# Patient Record
Sex: MALE | Race: OTHER | Employment: PART TIME | URBAN - METROPOLITAN AREA
[De-identification: names, ages, dates, MRNs, and addresses within clinical notes are randomized per-mention and may not be internally consistent; named-entity substitution may affect disease eponyms.]

---

## 2021-10-01 ENCOUNTER — APPOINTMENT (OUTPATIENT)
Dept: GENERAL RADIOLOGY | Age: 21
End: 2021-10-01

## 2021-10-01 ENCOUNTER — HOSPITAL ENCOUNTER (EMERGENCY)
Age: 21
Discharge: HOME | End: 2021-10-01
Attending: STUDENT IN AN ORGANIZED HEALTH CARE EDUCATION/TRAINING PROGRAM

## 2021-10-01 VITALS
OXYGEN SATURATION: 100 % | DIASTOLIC BLOOD PRESSURE: 68 MMHG | SYSTOLIC BLOOD PRESSURE: 124 MMHG | RESPIRATION RATE: 16 BRPM | HEART RATE: 73 BPM

## 2021-10-01 DIAGNOSIS — S61.212A LACERATION OF RIGHT MIDDLE FINGER, FOREIGN BODY PRESENCE UNSPECIFIED, NAIL DAMAGE STATUS UNSPECIFIED, INITIAL ENCOUNTER: Primary | ICD-10-CM

## 2021-10-01 DIAGNOSIS — S68.619A: ICD-10-CM

## 2021-10-01 DIAGNOSIS — S62.660A CLOSED NONDISPLACED FRACTURE OF DISTAL PHALANX OF RIGHT INDEX FINGER, INITIAL ENCOUNTER: ICD-10-CM

## 2021-10-01 PROCEDURE — 6360000002 HC RX W HCPCS: Performed by: NURSE PRACTITIONER

## 2021-10-01 PROCEDURE — 90471 IMMUNIZATION ADMIN: CPT | Performed by: NURSE PRACTITIONER

## 2021-10-01 PROCEDURE — 6370000000 HC RX 637 (ALT 250 FOR IP): Performed by: NURSE PRACTITIONER

## 2021-10-01 PROCEDURE — 96375 TX/PRO/DX INJ NEW DRUG ADDON: CPT

## 2021-10-01 PROCEDURE — 99283 EMERGENCY DEPT VISIT LOW MDM: CPT

## 2021-10-01 PROCEDURE — 73130 X-RAY EXAM OF HAND: CPT

## 2021-10-01 PROCEDURE — 96374 THER/PROPH/DIAG INJ IV PUSH: CPT

## 2021-10-01 PROCEDURE — 90715 TDAP VACCINE 7 YRS/> IM: CPT | Performed by: NURSE PRACTITIONER

## 2021-10-01 PROCEDURE — 12001 RPR S/N/AX/GEN/TRNK 2.5CM/<: CPT

## 2021-10-01 RX ORDER — OXYCODONE HYDROCHLORIDE AND ACETAMINOPHEN 5; 325 MG/1; MG/1
1 TABLET ORAL EVERY 6 HOURS PRN
Qty: 10 TABLET | Refills: 0 | Status: SHIPPED | OUTPATIENT
Start: 2021-10-01 | End: 2021-10-04

## 2021-10-01 RX ORDER — CEPHALEXIN 500 MG/1
500 CAPSULE ORAL 4 TIMES DAILY
Qty: 40 CAPSULE | Refills: 0 | Status: SHIPPED | OUTPATIENT
Start: 2021-10-01 | End: 2021-10-11

## 2021-10-01 RX ORDER — SULFAMETHOXAZOLE AND TRIMETHOPRIM 800; 160 MG/1; MG/1
1 TABLET ORAL 2 TIMES DAILY
Qty: 20 TABLET | Refills: 0 | Status: SHIPPED | OUTPATIENT
Start: 2021-10-01 | End: 2021-10-11

## 2021-10-01 RX ORDER — SULFAMETHOXAZOLE AND TRIMETHOPRIM 800; 160 MG/1; MG/1
1 TABLET ORAL ONCE
Status: COMPLETED | OUTPATIENT
Start: 2021-10-01 | End: 2021-10-01

## 2021-10-01 RX ADMIN — Medication 2000 MG: at 14:50

## 2021-10-01 RX ADMIN — SULFAMETHOXAZOLE AND TRIMETHOPRIM 1 TABLET: 800; 160 TABLET ORAL at 16:17

## 2021-10-01 RX ADMIN — HYDROMORPHONE HYDROCHLORIDE 0.5 MG: 1 INJECTION, SOLUTION INTRAMUSCULAR; INTRAVENOUS; SUBCUTANEOUS at 14:51

## 2021-10-01 RX ADMIN — TETANUS TOXOID, REDUCED DIPHTHERIA TOXOID AND ACELLULAR PERTUSSIS VACCINE, ADSORBED 0.5 ML: 5; 2.5; 8; 8; 2.5 SUSPENSION INTRAMUSCULAR at 14:51

## 2021-10-01 ASSESSMENT — ENCOUNTER SYMPTOMS
SORE THROAT: 0
RHINORRHEA: 0
ABDOMINAL PAIN: 0
SHORTNESS OF BREATH: 0
COLOR CHANGE: 0

## 2021-10-01 ASSESSMENT — PAIN SCALES - GENERAL: PAINLEVEL_OUTOF10: 3

## 2021-10-01 NOTE — ED PROVIDER NOTES
I independently examined and evaluated Ronald Rosa. In brief, patient is a 41-year-old male, presenting with concerns for right second and third finger injuries after getting it caught on machinery. Had subsequent amputation of the distal portion of his right index finger, also with laceration on the right middle finger. He is right-hand dominant. Denies any other injuries. Tetanus is updated here in the department. Focused exam revealed amputation of the distal portion of the right index finger, also with laceration to the dorsal aspect of the right middle finger, holding the finger in flexion unable to fully extend. Capillary refill intervening digits less than 2 seconds. XR HAND RIGHT (MIN 3 VIEWS)   Final Result   Soft tissue and bony amputation of the 2nd finger tip      Remote ununited fracture of the navicular bone           ED course: Patient is a 41-year-old male, right-hand-dominant, presenting with concerns for injury to the right hand and finger amputation. Patient was seen in conjunction with NP Beatris Cabot, please refer to her note for further details of the patient's work-up and treatment. Due to the amputation, as well as possible extensor tendon injury in the adjacent digit, orthopedics was consulted. They requested that we start the patient on antibiotics, place a nonadherent bulky dressing on the amputation and have the patient follow-up in the office early next week. Findings were discussed with the patient. He is comfortable in agreement with plan of care. Given strict return precautions. All diagnostic, treatment, and disposition decisions were made by myself in conjunction with the advanced practice provider. For all further details of the patient's emergency department visit, please see the advanced practice provider's documentation. 1. Laceration of right middle finger, foreign body presence unspecified, nail damage status unspecified, initial encounter    2. Closed nondisplaced fracture of distal phalanx of right index finger, initial encounter    3. Complete traumatic amputation of finger through phalanx, initial encounter      Comment: Please note this report has been produced using speech recognition software and may contain errors related to that system including errors in grammar, punctuation, and spelling, as well as words and phrases that may be inappropriate. If there are any questions or concerns please feel free to contact the dictating provider for clarification.        Candace Anderson MD  10/01/21 9637

## 2021-10-01 NOTE — ED NOTES
Discharge instructions reviewed with patient and family member. Patient and family verbalized understanding. All home medications have been reviewed, questions answered and patient voiced understanding. Given prescriptions, discharge instructions, and appointment times. Instructions reviewed using .       Cathie Miner RN  10/01/21 6746

## 2021-10-01 NOTE — ED PROVIDER NOTES
[address] ED  EMERGENCY DEPARTMENT ENCOUNTER        Pt Name: Christian Emerson  MRN: [id number]  [place] 2000  Dateof evaluation: 10/1/2021  Provider: DENIZ Lee CNP  PCP: No primary care provider on file. ED Attending: No att. providers found    [address]       Chief Complaint   Patient presents with    Finger Injury     patient was working on an elevator and his finger got caught and  cut the tip of his pointer finger off. Rekhanet also has laceration to the middle finger. HISTORY OF PRESENTILLNESS   (Location/Symptom, Timing/Onset, Context/Setting, Quality, Duration, Modifying Factors, Severity)  Note limiting factors. Christian Emerson is a 24 y.o. male for finger injury. Onset was today. Context includes patient reports he was working on elevator when his right index finger became caught. Patient has amputated the distal portion of his right index finger. Patient is right-hand dominant. He is not up-to-date on his tetanus. Alleviating factors include nothing. Aggravating factors include nothing. Pain is 8/10. Nothing has been used for pain today. Nursing Notes were all reviewed and agreed with or any disagreements were addressed  in the HPI. REVIEW OF SYSTEMS    (2-9 systems for level 4, 10 or more for level 5)     Review of Systems   Constitutional: Negative for fever. HENT: Negative for congestion, rhinorrhea and sore throat. Respiratory: Negative for shortness of breath. Cardiovascular: Negative for chest pain. Gastrointestinal: Negative for abdominal pain. Genitourinary: Negative for decreased urine volume and difficulty urinating. Musculoskeletal: Negative for arthralgias and myalgias. Finger injury   Skin: Negative for color change and rash. Neurological: Negative for dizziness and light-headedness. Psychiatric/Behavioral: Negative for agitation. All other systems reviewed and are negative.       Positives and Pertinent negatives as per HPI. Except as noted above in the ROS, all other systems were reviewed and negative. PAST MEDICAL HISTORY   History reviewed. No pertinent past medical history. SURGICAL HISTORY     History reviewed. No pertinent surgical history. CURRENT MEDICATIONS       Previous Medications    No medications on file         ALLERGIES     Patient has no known allergies. FAMILY HISTORY     History reviewed. No pertinent family history. SOCIAL HISTORY       Social History     Socioeconomic History    Marital status: Single     Spouse name: None    Number of children: None    Years of education: None    Highest education level: None   Occupational History    None   Tobacco Use    Smoking status: Former Smoker   Substance and Sexual Activity    Alcohol use: Yes     Alcohol/week: 12.0 standard drinks     Types: 12 Cans of beer per week     Comment: daily     Drug use: Never    Sexual activity: None   Other Topics Concern    None   Social History Narrative    None     Social Determinants of Health     Financial Resource Strain:     Difficulty of Paying Living Expenses:    Food Insecurity:     Worried About Running Out of Food in the Last Year:     Ran Out of Food in the Last Year:    Transportation Needs:     Lack of Transportation (Medical):      Lack of Transportation (Non-Medical):    Physical Activity:     Days of Exercise per Week:     Minutes of Exercise per Session:    Stress:     Feeling of Stress :    Social Connections:     Frequency of Communication with Friends and Family:     Frequency of Social Gatherings with Friends and Family:     Attends [employer] Services:     Active Member of Clubs or Organizations:     Attends Club or Organization Meetings:     Marital Status:    Intimate Partner Violence:     Fear of Current or Ex-Partner:     Emotionally Abused:     Physically Abused:     Sexually Abused:        [address]el[address] Opening: Spontaneous  Best Verbal Response: Oriented  Best Motor Response: Obeys commands  [place] Coma Scale Score: 15        PHYSICAL EXAM  (up to 7 for level 4, 8 or more for level 5)     ED Triage Vitals   BP Temp Temp src Pulse Resp SpO2 Height Weight   -- -- -- -- -- -- -- --       Physical Exam  Constitutional:       Appearance: He is well-developed. HENT:      Head: Normocephalic and atraumatic. Cardiovascular:      Rate and Rhythm: Normal rate. Pulmonary:      Effort: Pulmonary effort is normal. No respiratory distress. Abdominal:      General: There is no distension. Palpations: Abdomen is soft. Musculoskeletal:         General: Tenderness and signs of injury present. No swelling or deformity. Normal range of motion. Cervical back: Normal range of motion. Comments: Patient is able to flex and extend at the right DIP joint however distal to the DIP joint is a complete amputation. Bleeding is controlled. 2cm laceration to the dorsal aspect of the right middle finger. Distal phalanx is slightly flexed and the patient is unable to extend it straight. Skin:     General: Skin is warm and dry. Neurological:      Mental Status: He is alert and oriented to person, place, and time. DIAGNOSTIC RESULTS   LABS:    Labs Reviewed - No data to display    All other labs werewithin normal range or not returned as of this dictation. EKG: All EKG's are interpreted by the Emergency Department Physician who either signs or Co-signs this chart in the absence of a cardiologist.  Please see their note for interpretation of EKG.       RADIOLOGY:   Right hand x-ray interpreted by radiologist for  FINDINGS:   There is amputation of the tip of the 2nd finger.  There is soft tissue loss   with fracture of the distal phalanx.  There is a bony fragment displaced   volarly.  Incidental note is made of a smooth irregular lucency extending   through the scaphoid bone most consistent with a un united relatively   nondisplaced fracture.  The remainder of the osseous structures are intact. Interpretation per the Radiologist below, if available at the time of this note:    XR HAND RIGHT (MIN 3 VIEWS)   Final Result   Soft tissue and bony amputation of the 2nd finger tip      Remote ununited fracture of the navicular bone           No results found. PROCEDURES   Unless otherwise noted below, none     Lac Repair    Date/Time: 10/1/2021 3:40 PM  Performed by: DENIZ Tovar - CNP  Authorized by: Missy Ireland MD     Consent:     Consent obtained:  Verbal    Consent given by:  Patient    Risks discussed:  Infection, pain, poor cosmetic result and poor wound healing    Alternatives discussed:  No treatment  Anesthesia (see MAR for exact dosages):      Anesthesia method:  Local infiltration    Local anesthetic:  Lidocaine 2% w/o epi  Laceration details:     Location:  Finger    Finger location:  R long finger    Length (cm):  2  Repair type:     Repair type:  Simple  Pre-procedure details:     Preparation:  Patient was prepped and draped in usual sterile fashion and imaging obtained to evaluate for foreign bodies  Exploration:     Hemostasis achieved with:  Direct pressure    Wound exploration: wound explored through full range of motion      Wound extent: areolar tissue violated and fascia violated      Wound extent: no foreign bodies/material noted, no muscle damage noted, no nerve damage noted, no tendon damage noted, no underlying fracture noted and no vascular damage noted    Treatment:     Area cleansed with:  Hibiclens    Amount of cleaning:  Standard    Irrigation solution:  Sterile water    Irrigation method:  Syringe  Skin repair:     Repair method:  Sutures    Suture size:  6-0    Suture material:  Prolene    Suture technique:  Simple interrupted    Number of sutures:  3  Approximation:     Approximation:  Loose  Post-procedure details:     Dressing:  Non-adherent dressing and splint for protection    Patient tolerance of procedure: Tolerated well, no immediate complications  Comments:      Right index finger was heavily padded and splinted in addition to the right middle finger was splinted. CRITICAL CARE TIME   N/A    CONSULTS:  IP CONSULT TO ORTHOPEDIC SURGERY      EMERGENCYDEPARTMENT COURSE and DIFFERENTIAL DIAGNOSIS/MDM:   Vitals:    Vitals:    10/01/21 1500   BP: (!) 107/49   Pulse: 69   Resp: 14   SpO2: 100%       Patient was given the following medications:  Medications   sulfamethoxazole-trimethoprim (BACTRIM DS;SEPTRA DS) 800-160 MG per tablet 1 tablet (has no administration in time range)   Tetanus-Diphth-Acell Pertussis (BOOSTRIX) injection 0.5 mL (0.5 mLs IntraMUSCular Given 10/1/21 1451)   ceFAZolin (ANCEF) 2000 mg in sterile water 20 mL IV syringe (2,000 mg IntraVENous Given 10/1/21 1450)   HYDROmorphone (DILAUDID) injection 0.5 mg (0.5 mg IntraVENous Given 10/1/21 1451)                       Patient was seen and evaluated by myself and Ira Dupont. Patient here after he was involved in accident while working on elevator. Patient states that he had his fingers injured in a belt. Patient is unsure of his tetanus status. Patient is right-hand dominant. On exam he is awake and alert hemodynamically stable nontoxic in appearance. Patient does have a complete amputation to the distal portion of his right index finger. He also has a laceration to the dorsal aspect of the distal portion of the right middle finger. He is unable to extend that finger and the tip is slightly flexed. Patient did have a laceration repair to the middle finger. I did talk with Dr. Stephanie Banuelos the PA from orthopedics who recommended nonstick heavy padded dressing for the right index finger and to follow-up with Ortho in a week. Patient was given Ancef in the ED was also given pain medications and up-to-date on his tetanus. Patient will be discharged with pain meds and antibiotics.  He was given strict instructions to follow-up with orthopedics for next week. He was encouraged to return to the ED sooner. Patient was provided with the PCP referral as well and encouraged to follow-up and establish care. Patient verbalized understanding. Patient was ultimately discharged home with all questions answered. The patient tolerated their visit well. I have evaluated this patient. My supervising physician was available for consultation. The patient and / or the family were informed of the results of any tests, a time was given to answer questions, a plan was proposed and they agreed with plan. FINAL IMPRESSION      1. Laceration of right middle finger, foreign body presence unspecified, nail damage status unspecified, initial encounter    2. Closed nondisplaced fracture of distal phalanx of right index finger, initial encounter    3. Complete traumatic amputation of finger through phalanx, initial encounter          DISPOSITION/PLAN   DISPOSITION        PATIENT REFERRED TO:  [facility]) Pre-Services  [phone]  Schedule an appointment as soon as possible for a visit in 1 week  to establish primary care    [facility] ED  [address]  [phone]    If symptoms worsen    [address] [facility], [address] [place] [address]    [phone]  Call today  to be re evaluated. , for re-evaluation      DISCHARGE MEDICATIONS:  New Prescriptions    CEPHALEXIN (KEFLEX) 500 MG CAPSULE    Take 1 capsule by mouth 4 times daily for 10 days    OXYCODONE-ACETAMINOPHEN (PERCOCET) 5-325 MG PER TABLET    Take 1 tablet by mouth every 6 hours as needed for Pain for up to 3 days. WARNING:  May cause drowsiness. May impair ability to operate vehicles or machinery. Do not use in combination with alcohol.     SULFAMETHOXAZOLE-TRIMETHOPRIM (BACTRIM DS) 800-160 MG PER TABLET    Take 1 tablet by mouth 2 times daily for 10 days       DISCONTINUED MEDICATIONS:  Discontinued Medications    No medications on file              (Please note that portions of this note were completed with a voice recognition program.  Efforts were made to edit the dictations but occasionally words are mis-transcribed.)    DENIZ Corea CNP (electronically signed)         DENIZ Corea CNP  10/01/21 1540

## 2021-10-01 NOTE — ED NOTES
[facility] [facility] Call served Ortho Annita Honeycutt  10/01/21 1418    Yadira Aragon NP talked to Annita CARTER w/Radha Patricio  10/01/21 7026

## 2021-10-06 ENCOUNTER — OFFICE VISIT (OUTPATIENT)
Dept: FAMILY MEDICINE CLINIC | Age: 21
End: 2021-10-06

## 2021-10-06 VITALS
HEART RATE: 75 BPM | RESPIRATION RATE: 16 BRPM | DIASTOLIC BLOOD PRESSURE: 68 MMHG | WEIGHT: 155.8 LBS | SYSTOLIC BLOOD PRESSURE: 118 MMHG | OXYGEN SATURATION: 99 % | TEMPERATURE: 97.8 F | HEIGHT: 68 IN | BODY MASS INDEX: 23.61 KG/M2

## 2021-10-06 DIAGNOSIS — S62.630B DISPLACED FRACTURE OF DISTAL PHALANX OF RIGHT INDEX FINGER, INITIAL ENCOUNTER FOR OPEN FRACTURE: ICD-10-CM

## 2021-10-06 DIAGNOSIS — S68.119A TRAUMATIC AMPUTATION OF TIP OF FINGER, INITIAL ENCOUNTER: Primary | ICD-10-CM

## 2021-10-06 DIAGNOSIS — S61.212A LACERATION OF RIGHT MIDDLE FINGER WITHOUT DAMAGE TO NAIL, FOREIGN BODY PRESENCE UNSPECIFIED, INITIAL ENCOUNTER: ICD-10-CM

## 2021-10-06 PROCEDURE — 99205 OFFICE O/P NEW HI 60 MIN: CPT | Performed by: PHYSICIAN ASSISTANT

## 2021-10-06 ASSESSMENT — PATIENT HEALTH QUESTIONNAIRE - PHQ9
2. FEELING DOWN, DEPRESSED OR HOPELESS: 0
SUM OF ALL RESPONSES TO PHQ QUESTIONS 1-9: 0
SUM OF ALL RESPONSES TO PHQ9 QUESTIONS 1 & 2: 0
1. LITTLE INTEREST OR PLEASURE IN DOING THINGS: 0
SUM OF ALL RESPONSES TO PHQ QUESTIONS 1-9: 0
SUM OF ALL RESPONSES TO PHQ QUESTIONS 1-9: 0

## 2021-10-06 NOTE — PROGRESS NOTES
[address] COMPLAINT  Chief Complaint   Patient presents with    Establish Care     Pt is here to establish care. HISTORY OF PRESENT  ILLNESS  Corina Reed is a 24 y.o.  male with assistance of  and his cousin is also present. He is NEW to provider s/p injury to right index and long fingerS. Repairs done 10/1 in ER. Using an elevator to lift shingles to roof. Elevator jammed and he tried to fix it then finger became jammed in the belt. Today feels better, less pain. Taking abx. Pain stable. ROS   Remaining 14 ROS were reviewed and are unremarkable for other constitutional, EENT, cardiac, pulmonary, GI, , neurologic, musculoskeletal, or integumentary complaints. PAST MEDICAL/SURGICAL, SOCIAL, &  FAMILY HISTORY:  Reviewed and updated accordingly. ALLERGIES :   Patient has no known allergies. MEDICATIONS:  Current Outpatient Medications on File Prior to Visit   Medication Sig Dispense Refill    sulfamethoxazole-trimethoprim (BACTRIM DS) 800-160 MG per tablet Take 1 tablet by mouth 2 times daily for 10 days 20 tablet 0    cephALEXin (KEFLEX) 500 MG capsule Take 1 capsule by mouth 4 times daily for 10 days 40 capsule 0     No current facility-administered medications on file prior to visit. PHYSICAL EXAM     Vitals:    10/06/21 1514   BP: 118/68   Site: Right Upper Arm   Position: Sitting   Pulse: 75   Resp: 16   Temp: 97.8 °F (36.6 °C)   SpO2: 99%   Weight: 155 lb 12.8 oz (70.7 kg)   Height: 5' 8.1\" (1.73 m)     APPEARANCE:  Sitting comfortably without obvious distress. HEART: Reg rate and rhythm. No murmurs, rubs, or gallops. LUNGS: Clear to auscultation. No wheezes, rales, or rhonchi. CERVICAL SPINE: No skin or bony deformities. No paraspinal or spinous process tenderness. Moves neck in all planes without eliciting pain. UPPER EXTREMITIES:  RIF with distal phalanx open fracture.   LLF with sutures in place on dorsal side of long finger. PROCEDURE:  Dressing was debrided off after 15 min soak in hibiclens and sterile saline. A sterile dressing was placed with antibiotic ointment over open fracture. Then bulk dressing was applied to whole hand. He had pain but tolerated procedure. Advised to keep dressing on at all times. Keep dry and clean. A referral to Hand specialist ASAP will be made. His cousin, Chauncey Park( on [employer] ) was also given instructions. All done via . IMPRESSION/ PLAN     1. Traumatic amputation of tip of finger, initial encounter    2. Displaced fracture of distal phalanx of right index finger, initial encounter for open fracture    3. Laceration of right middle finger without damage to nail, foreign body presence unspecified, initial encounter        Dressing redone but needs hand referral emergently. Calls have been placed  to 3 hand specialist groups. Await return calls.      Orders Placed This Encounter   Procedures    External Referral To Hand Surgery     ADDENDUM: 10/7/21 6pm: PT advised to go to [facility] Emergency Department      Electronically signed by RAUL Gomez on 10/6/2021 at 5:14 PM

## 2021-10-07 ENCOUNTER — TELEPHONE (OUTPATIENT)
Dept: INTERNAL MEDICINE CLINIC | Age: 21
End: 2021-10-07

## 2021-10-07 DIAGNOSIS — S69.91XS INJURY OF FINGER OF RIGHT HAND, SEQUELA: Primary | ICD-10-CM

## 2021-10-07 NOTE — TELEPHONE ENCOUNTER
Spoke with Mallory Mccarthy to make sure pt went to [facility] ER. He states they are there now.

## 2021-10-11 ENCOUNTER — TELEPHONE (OUTPATIENT)
Dept: INTERNAL MEDICINE CLINIC | Age: 21
End: 2021-10-11

## 2021-10-11 DIAGNOSIS — S69.91XS INJURY OF FINGER OF RIGHT HAND, SEQUELA: Primary | ICD-10-CM

## 2021-10-11 NOTE — TELEPHONE ENCOUNTER
Ronal and his cousin, James De La Vega called. He has an ortho appointment Wednesday, 10/12/21 with [facility].